# Patient Record
Sex: FEMALE | Race: OTHER | NOT HISPANIC OR LATINO | URBAN - METROPOLITAN AREA
[De-identification: names, ages, dates, MRNs, and addresses within clinical notes are randomized per-mention and may not be internally consistent; named-entity substitution may affect disease eponyms.]

---

## 2019-04-14 ENCOUNTER — EMERGENCY (EMERGENCY)
Facility: HOSPITAL | Age: 19
LOS: 1 days | Discharge: HOME | End: 2019-04-14
Attending: EMERGENCY MEDICINE
Payer: MEDICAID

## 2019-04-14 VITALS
HEART RATE: 105 BPM | SYSTOLIC BLOOD PRESSURE: 131 MMHG | HEIGHT: 66 IN | RESPIRATION RATE: 18 BRPM | WEIGHT: 138.89 LBS | TEMPERATURE: 98 F | OXYGEN SATURATION: 99 % | DIASTOLIC BLOOD PRESSURE: 86 MMHG

## 2019-04-14 PROCEDURE — 93010 ELECTROCARDIOGRAM REPORT: CPT

## 2019-04-14 PROCEDURE — 99285 EMERGENCY DEPT VISIT HI MDM: CPT | Mod: 25

## 2019-04-14 NOTE — ED ADULT TRIAGE NOTE - CHIEF COMPLAINT QUOTE
reports diaphoresis, tachycardia (up to 150) at home for the last 4 days; did not check temp; po intake ok; reports 10 lb wt loss in one month without trying; comes in for no change

## 2019-04-15 VITALS
SYSTOLIC BLOOD PRESSURE: 122 MMHG | OXYGEN SATURATION: 100 % | DIASTOLIC BLOOD PRESSURE: 87 MMHG | RESPIRATION RATE: 18 BRPM | TEMPERATURE: 98 F | HEART RATE: 82 BPM

## 2019-04-15 LAB
ALBUMIN SERPL ELPH-MCNC: 4.5 G/DL — SIGNIFICANT CHANGE UP (ref 3.3–5)
ALP SERPL-CCNC: 89 U/L — SIGNIFICANT CHANGE UP (ref 40–120)
ALT FLD-CCNC: 25 U/L — SIGNIFICANT CHANGE UP (ref 10–45)
ANION GAP SERPL CALC-SCNC: 16 MMOL/L — SIGNIFICANT CHANGE UP (ref 5–17)
APPEARANCE UR: ABNORMAL
AST SERPL-CCNC: 24 U/L — SIGNIFICANT CHANGE UP (ref 10–40)
BACTERIA # UR AUTO: ABNORMAL
BASOPHILS # BLD AUTO: 0 K/UL — SIGNIFICANT CHANGE UP (ref 0–0.2)
BASOPHILS NFR BLD AUTO: 0.3 % — SIGNIFICANT CHANGE UP (ref 0–2)
BILIRUB SERPL-MCNC: 0.2 MG/DL — SIGNIFICANT CHANGE UP (ref 0.2–1.2)
BILIRUB UR-MCNC: NEGATIVE — SIGNIFICANT CHANGE UP
BUN SERPL-MCNC: 16 MG/DL — SIGNIFICANT CHANGE UP (ref 7–23)
CALCIUM SERPL-MCNC: 9.7 MG/DL — SIGNIFICANT CHANGE UP (ref 8.4–10.5)
CHLORIDE SERPL-SCNC: 101 MMOL/L — SIGNIFICANT CHANGE UP (ref 96–108)
CO2 SERPL-SCNC: 23 MMOL/L — SIGNIFICANT CHANGE UP (ref 22–31)
COLOR SPEC: SIGNIFICANT CHANGE UP
CREAT SERPL-MCNC: 0.63 MG/DL — SIGNIFICANT CHANGE UP (ref 0.5–1.3)
D DIMER BLD IA.RAPID-MCNC: 277 NG/ML DDU — HIGH
DIFF PNL FLD: NEGATIVE — SIGNIFICANT CHANGE UP
EOSINOPHIL # BLD AUTO: 0.1 K/UL — SIGNIFICANT CHANGE UP (ref 0–0.5)
EOSINOPHIL NFR BLD AUTO: 1.4 % — SIGNIFICANT CHANGE UP (ref 0–6)
EPI CELLS # UR: 14 /HPF — HIGH
GLUCOSE SERPL-MCNC: 89 MG/DL — SIGNIFICANT CHANGE UP (ref 70–99)
GLUCOSE UR QL: NEGATIVE — SIGNIFICANT CHANGE UP
HCG SERPL-ACNC: <2 MIU/ML — SIGNIFICANT CHANGE UP
HCT VFR BLD CALC: 38.9 % — SIGNIFICANT CHANGE UP (ref 34.5–45)
HGB BLD-MCNC: 13 G/DL — SIGNIFICANT CHANGE UP (ref 11.5–15.5)
HIV 1 & 2 AB SERPL IA.RAPID: SIGNIFICANT CHANGE UP
HYALINE CASTS # UR AUTO: 1 /LPF — SIGNIFICANT CHANGE UP (ref 0–2)
KETONES UR-MCNC: NEGATIVE — SIGNIFICANT CHANGE UP
LEUKOCYTE ESTERASE UR-ACNC: ABNORMAL
LYMPHOCYTES # BLD AUTO: 2.4 K/UL — SIGNIFICANT CHANGE UP (ref 1–3.3)
LYMPHOCYTES # BLD AUTO: 42.8 % — SIGNIFICANT CHANGE UP (ref 13–44)
MAGNESIUM SERPL-MCNC: 1.8 MG/DL — SIGNIFICANT CHANGE UP (ref 1.6–2.6)
MCHC RBC-ENTMCNC: 27.7 PG — SIGNIFICANT CHANGE UP (ref 27–34)
MCHC RBC-ENTMCNC: 33.4 GM/DL — SIGNIFICANT CHANGE UP (ref 32–36)
MCV RBC AUTO: 83 FL — SIGNIFICANT CHANGE UP (ref 80–100)
MONOCYTES # BLD AUTO: 0.6 K/UL — SIGNIFICANT CHANGE UP (ref 0–0.9)
MONOCYTES NFR BLD AUTO: 9.9 % — SIGNIFICANT CHANGE UP (ref 2–14)
NEUTROPHILS # BLD AUTO: 2.6 K/UL — SIGNIFICANT CHANGE UP (ref 1.8–7.4)
NEUTROPHILS NFR BLD AUTO: 45.7 % — SIGNIFICANT CHANGE UP (ref 43–77)
NITRITE UR-MCNC: NEGATIVE — SIGNIFICANT CHANGE UP
PH UR: 6.5 — SIGNIFICANT CHANGE UP (ref 5–8)
PHOSPHATE SERPL-MCNC: 3.9 MG/DL — SIGNIFICANT CHANGE UP (ref 2.5–4.5)
PLATELET # BLD AUTO: 299 K/UL — SIGNIFICANT CHANGE UP (ref 150–400)
POTASSIUM SERPL-MCNC: 3.9 MMOL/L — SIGNIFICANT CHANGE UP (ref 3.5–5.3)
POTASSIUM SERPL-SCNC: 3.9 MMOL/L — SIGNIFICANT CHANGE UP (ref 3.5–5.3)
PROT SERPL-MCNC: 8.3 G/DL — SIGNIFICANT CHANGE UP (ref 6–8.3)
PROT UR-MCNC: NEGATIVE — SIGNIFICANT CHANGE UP
RBC # BLD: 4.69 M/UL — SIGNIFICANT CHANGE UP (ref 3.8–5.2)
RBC # FLD: 11.8 % — SIGNIFICANT CHANGE UP (ref 10.3–14.5)
RBC CASTS # UR COMP ASSIST: 1 /HPF — SIGNIFICANT CHANGE UP (ref 0–4)
SODIUM SERPL-SCNC: 140 MMOL/L — SIGNIFICANT CHANGE UP (ref 135–145)
SP GR SPEC: 1.01 — SIGNIFICANT CHANGE UP (ref 1.01–1.02)
TSH SERPL-MCNC: 0.01 UIU/ML — LOW (ref 0.27–4.2)
UROBILINOGEN FLD QL: NEGATIVE — SIGNIFICANT CHANGE UP
WBC # BLD: 5.7 K/UL — SIGNIFICANT CHANGE UP (ref 3.8–10.5)
WBC # FLD AUTO: 5.7 K/UL — SIGNIFICANT CHANGE UP (ref 3.8–10.5)
WBC UR QL: 10 /HPF — HIGH (ref 0–5)

## 2019-04-15 PROCEDURE — 84100 ASSAY OF PHOSPHORUS: CPT

## 2019-04-15 PROCEDURE — 86703 HIV-1/HIV-2 1 RESULT ANTBDY: CPT

## 2019-04-15 PROCEDURE — 71275 CT ANGIOGRAPHY CHEST: CPT

## 2019-04-15 PROCEDURE — 96360 HYDRATION IV INFUSION INIT: CPT | Mod: XU

## 2019-04-15 PROCEDURE — 93005 ELECTROCARDIOGRAM TRACING: CPT

## 2019-04-15 PROCEDURE — 84443 ASSAY THYROID STIM HORMONE: CPT

## 2019-04-15 PROCEDURE — 81001 URINALYSIS AUTO W/SCOPE: CPT

## 2019-04-15 PROCEDURE — 80053 COMPREHEN METABOLIC PANEL: CPT

## 2019-04-15 PROCEDURE — 71275 CT ANGIOGRAPHY CHEST: CPT | Mod: 26

## 2019-04-15 PROCEDURE — 96361 HYDRATE IV INFUSION ADD-ON: CPT

## 2019-04-15 PROCEDURE — 84702 CHORIONIC GONADOTROPIN TEST: CPT

## 2019-04-15 PROCEDURE — 99284 EMERGENCY DEPT VISIT MOD MDM: CPT | Mod: 25

## 2019-04-15 PROCEDURE — 85027 COMPLETE CBC AUTOMATED: CPT

## 2019-04-15 PROCEDURE — 83735 ASSAY OF MAGNESIUM: CPT

## 2019-04-15 PROCEDURE — 71046 X-RAY EXAM CHEST 2 VIEWS: CPT

## 2019-04-15 PROCEDURE — 71046 X-RAY EXAM CHEST 2 VIEWS: CPT | Mod: 26

## 2019-04-15 PROCEDURE — 85379 FIBRIN DEGRADATION QUANT: CPT

## 2019-04-15 RX ORDER — SODIUM CHLORIDE 9 MG/ML
1000 INJECTION, SOLUTION INTRAVENOUS ONCE
Qty: 0 | Refills: 0 | Status: COMPLETED | OUTPATIENT
Start: 2019-04-15 | End: 2019-04-15

## 2019-04-15 RX ADMIN — SODIUM CHLORIDE 1000 MILLILITER(S): 9 INJECTION, SOLUTION INTRAVENOUS at 00:52

## 2019-04-15 RX ADMIN — SODIUM CHLORIDE 1000 MILLILITER(S): 9 INJECTION, SOLUTION INTRAVENOUS at 03:29

## 2019-04-15 NOTE — ED PROVIDER NOTE - NSFOLLOWUPINSTRUCTIONS_ED_ALL_ED_FT
1. palpitations  2. stay well-hydrated  3. Follow-up with your primary care provider in 24-48 hours  4. Return immediately to the emergency department if any worsening or concerning symptoms.

## 2019-04-15 NOTE — ED PROVIDER NOTE - ATTENDING CONTRIBUTION TO CARE
Attending MD Pat:  I personally have seen and examined this patient.  Resident note reviewed and agree on plan of care and except where noted.  See HPI, PE, and MDM for details.

## 2019-04-15 NOTE — ED PROVIDER NOTE - NEURO NEGATIVE STATEMENT, MLM
no weakness in extremities. no loss of consciousness, no gait abnormality, no headache, no sensory deficits, and no weakness.

## 2019-04-15 NOTE — ED PROVIDER NOTE - CLINICAL SUMMARY MEDICAL DECISION MAKING FREE TEXT BOX
Attending MD Pat: 18F with palpitations and dyspnea x several days, also with 10lb weight loss. Exam is nonlocalizing currently other than occasional low grade tachycardia, EKG wnl, no e/o ACS, myocarditis or pericarditis. Plan for labs, dimer to ro PE, CXR, TSH and re-eval

## 2019-04-15 NOTE — ED PROVIDER NOTE - GASTROINTESTINAL NEGATIVE STATEMENT, MLM
no abdominal pain, no bloating, no constipation, no diarrhea, no nausea and no vomiting. No blood in stool.

## 2019-04-15 NOTE — ED PROVIDER NOTE - CHPI ED SYMPTOMS NEG
no cough/no chills/no runny nose, no sore throat, no difficulty breathing, no abd pain , no N/V/D, no burning urination, no hematuria, no blood in stool, no weakness in extremities./no fever

## 2019-04-15 NOTE — ED PROVIDER NOTE - PROGRESS NOTE DETAILS
ED Sign Out, eval for palpitations/chest pain, pending CTA, nml VS/HD stable, likely d/c w/ close outpt fu if all wnl -- Fernandez Goldstein MD Resident: CTA negative for PE. Current vitals patient is normocardic, normotensive, not concerned for thyrotoxicosis, TSH still pending. Explained that this result will not come back immedaitely and that they can call for results tomorrow. Provided copies of labwork to patient. Will dc home with primary care doctor follow-up.

## 2019-04-15 NOTE — ED PROVIDER NOTE - OBJECTIVE STATEMENT
17 y/o female with no significant pmhx c/o sternal CP and tachycardia x4 days. +diaphoresis and weight loss (10 lbs x1 month). Pt states she has persistent sweating and hot flashes at night since sxs onset. Notes one episode of sudden sternal CP described as heaviness and shaking in chest lasting approximately 1 minute. Denies any difficulty breathing during episode. Reports HA > 100 bpm since sxs onset. Pt also presents to ED c/o ear pain and 2 episodes of bleeding in ears. Notes she was seen by ENT with no dx given. Denies fever, chills, cough, runny nose, sore throat, difficulty breathing, abd pain, N/V/D, burning urination, hematuria, blood in stool, or weakness in arms or legs. Currently sexually active with 1 male partner, sometimes uses protection. NKDA.

## 2019-04-16 NOTE — ED POST DISCHARGE NOTE - DETAILS
spoke with patients mother who is listed as emergency contact who states patient has left for vacation in [place] for 1 week and ther is no better number to reach her. discussed patient needs follow up as soon as possible, recommended she make appt for first thing when she returns and that if her symptoms persist or get worse she should return to an ER in [place]. patients mother demonstrates understanding and will contact her daughter. she also took down admin line number for further questions patient called back, discussed results, she reports HR on her monitor is still > 115 and she is still symptomatic. advised she should go to an ER if her symptoms continue and she was given admin number to call if she is able to see a provider and they require results - Catie Dong PA-C

## 2019-09-17 PROBLEM — Z78.9 OTHER SPECIFIED HEALTH STATUS: Chronic | Status: ACTIVE | Noted: 2019-04-15

## 2019-10-11 PROBLEM — Z00.00 ENCOUNTER FOR PREVENTIVE HEALTH EXAMINATION: Status: ACTIVE | Noted: 2019-10-11

## 2019-10-21 ENCOUNTER — APPOINTMENT (OUTPATIENT)
Dept: CARDIOLOGY | Facility: CLINIC | Age: 19
End: 2019-10-21
Payer: MEDICAID

## 2019-10-21 ENCOUNTER — NON-APPOINTMENT (OUTPATIENT)
Age: 19
End: 2019-10-21

## 2019-10-21 VITALS
DIASTOLIC BLOOD PRESSURE: 76 MMHG | OXYGEN SATURATION: 99 % | HEART RATE: 109 BPM | WEIGHT: 141 LBS | BODY MASS INDEX: 23.49 KG/M2 | HEIGHT: 65 IN | SYSTOLIC BLOOD PRESSURE: 100 MMHG

## 2019-10-21 DIAGNOSIS — Z86.39 PERSONAL HISTORY OF OTHER ENDOCRINE, NUTRITIONAL AND METABOLIC DISEASE: ICD-10-CM

## 2019-10-21 DIAGNOSIS — R00.2 PALPITATIONS: ICD-10-CM

## 2019-10-21 DIAGNOSIS — Z83.438 FAMILY HISTORY OF OTHER DISORDER OF LIPOPROTEIN METABOLISM AND OTHER LIPIDEMIA: ICD-10-CM

## 2019-10-21 DIAGNOSIS — R07.89 OTHER CHEST PAIN: ICD-10-CM

## 2019-10-21 PROCEDURE — 93225 XTRNL ECG REC<48 HRS REC: CPT

## 2019-10-21 PROCEDURE — 93000 ELECTROCARDIOGRAM COMPLETE: CPT | Mod: 59

## 2019-10-21 PROCEDURE — 99204 OFFICE O/P NEW MOD 45 MIN: CPT

## 2019-10-21 RX ORDER — PROPRANOLOL HYDROCHLORIDE 10 MG/1
10 TABLET ORAL
Refills: 0 | Status: ACTIVE | COMMUNITY
Start: 2019-10-21

## 2019-10-21 RX ORDER — METHIMAZOLE 10 MG/1
10 TABLET ORAL
Refills: 0 | Status: ACTIVE | COMMUNITY
Start: 2019-10-21

## 2019-10-21 NOTE — PHYSICAL EXAM
[General Appearance - Well Developed] : well developed [Normal Appearance] : normal appearance [Well Groomed] : well groomed [General Appearance - In No Acute Distress] : no acute distress [No Deformities] : no deformities [General Appearance - Well Nourished] : well nourished [Normal Conjunctiva] : the conjunctiva exhibited no abnormalities [Eyelids - No Xanthelasma] : the eyelids demonstrated no xanthelasmas [No Oral Pallor] : no oral pallor [Normal Oral Mucosa] : normal oral mucosa [No Oral Cyanosis] : no oral cyanosis [Normal Jugular Venous V Waves Present] : normal jugular venous V waves present [Normal Jugular Venous A Waves Present] : normal jugular venous A waves present [No Jugular Venous Bennett A Waves] : no jugular venous bennett A waves [Heart Rate And Rhythm] : heart rate and rhythm were normal [Heart Sounds] : normal S1 and S2 [Murmurs] : no murmurs present [Edema] : no peripheral edema present [Left Carotid Bruit] : no bruit heard over the left carotid [Right Carotid Bruit] : no bruit heard over the right carotid [1+] : left 1+ [Bruit] : no bruit heard [Exaggerated Use Of Accessory Muscles For Inspiration] : no accessory muscle use [Respiration, Rhythm And Depth] : normal respiratory rhythm and effort [Abdomen Soft] : soft [Auscultation Breath Sounds / Voice Sounds] : lungs were clear to auscultation bilaterally [Bowel Sounds] : normal bowel sounds [Abdomen Tenderness] : non-tender [Abnormal Walk] : normal gait [Gait - Sufficient For Exercise Testing] : the gait was sufficient for exercise testing [Nail Clubbing] : no clubbing of the fingernails [Petechial Hemorrhages (___cm)] : no petechial hemorrhages [Cyanosis, Localized] : no localized cyanosis [Skin Color & Pigmentation] : normal skin color and pigmentation [] : no rash [No Venous Stasis] : no venous stasis [Skin Lesions] : no skin lesions [No Skin Ulcers] : no skin ulcer [No Xanthoma] : no  xanthoma was observed [Oriented To Time, Place, And Person] : oriented to person, place, and time [No Anxiety] : not feeling anxious [Mood] : the mood was normal [Affect] : the affect was normal

## 2019-10-21 NOTE — REASON FOR VISIT
[Initial Evaluation] : an initial evaluation of [Chest Pain] : chest pain [Palpitations] : palpitations

## 2019-10-21 NOTE — HISTORY OF PRESENT ILLNESS
[FreeTextEntry1] : She is 19 years of age with recent finding of hyperthyroidism placed on methimazole and propranolol for sinus tachycardia in the 150 beats per minute range it sounds. Recent thyroid T3 values have improved on therapy and her sinus tachycardia has gotten better but she still experiences intermittent palpitations associated with chest discomfort. She's had to limit her exercise ability as a result. Here for cardiac assessment today. Has had problems with weight changes as a result as well.

## 2019-10-21 NOTE — DISCUSSION/SUMMARY
[___ Week(s)] : [unfilled] week(s) [FreeTextEntry1] : Ordered an echocardiogram to assess LV function and valvular status along with a 24-hour Holter monitor to document dysrhythmia. Regular stress testing was also ordered to check for cardiac reserve to fitness and assess vital response to activity. Following home heart rates and blood pressure is was also encouraged and she'll follow up with you for care. She is advised to see me in about one months time or sooner if needed to review these findings. Propranolol can be used as she's doing now once daily p.r.n. and if her blood pressures have been marginal she could take half a tablet at night if needed. Liberalization of fluid intake was also encouraged given marginal blood pressure. Heart healthy diet with low fat, low cholesterol foods. Aerobic fitness in general recommended that she is advised to avoid activities now until further workup and reviewed. She verbalized understanding and agreed to the plan. [FreeTextEntry3] : echo, holter, stress test

## 2019-11-11 ENCOUNTER — APPOINTMENT (OUTPATIENT)
Dept: CARDIOLOGY | Facility: CLINIC | Age: 19
End: 2019-11-11
Payer: MEDICAID

## 2019-11-11 PROCEDURE — 93306 TTE W/DOPPLER COMPLETE: CPT

## 2019-11-19 ENCOUNTER — APPOINTMENT (OUTPATIENT)
Dept: CARDIOLOGY | Facility: CLINIC | Age: 19
End: 2019-11-19

## 2019-11-26 ENCOUNTER — APPOINTMENT (OUTPATIENT)
Dept: CARDIOLOGY | Facility: CLINIC | Age: 19
End: 2019-11-26

## 2019-12-23 ENCOUNTER — EMERGENCY (EMERGENCY)
Facility: HOSPITAL | Age: 19
LOS: 1 days | Discharge: HOME | End: 2019-12-23
Attending: EMERGENCY MEDICINE
Payer: MEDICAID

## 2019-12-23 VITALS
DIASTOLIC BLOOD PRESSURE: 83 MMHG | OXYGEN SATURATION: 98 % | HEART RATE: 81 BPM | RESPIRATION RATE: 18 BRPM | SYSTOLIC BLOOD PRESSURE: 118 MMHG | TEMPERATURE: 98 F

## 2019-12-23 VITALS
HEIGHT: 65 IN | OXYGEN SATURATION: 98 % | DIASTOLIC BLOOD PRESSURE: 78 MMHG | SYSTOLIC BLOOD PRESSURE: 119 MMHG | RESPIRATION RATE: 18 BRPM | TEMPERATURE: 99 F | HEART RATE: 119 BPM | WEIGHT: 145.06 LBS

## 2019-12-23 LAB
APPEARANCE UR: ABNORMAL
BACTERIA # UR AUTO: ABNORMAL
BILIRUB UR-MCNC: NEGATIVE — SIGNIFICANT CHANGE UP
COLOR SPEC: ABNORMAL
DIFF PNL FLD: NEGATIVE — SIGNIFICANT CHANGE UP
EPI CELLS # UR: 69 /HPF — HIGH
GLUCOSE UR QL: NEGATIVE — SIGNIFICANT CHANGE UP
HYALINE CASTS # UR AUTO: 6 /LPF — HIGH (ref 0–2)
KETONES UR-MCNC: NEGATIVE — SIGNIFICANT CHANGE UP
LEUKOCYTE ESTERASE UR-ACNC: ABNORMAL
NITRITE UR-MCNC: NEGATIVE — SIGNIFICANT CHANGE UP
PH UR: 6 — SIGNIFICANT CHANGE UP (ref 5–8)
PROT UR-MCNC: ABNORMAL
RBC CASTS # UR COMP ASSIST: 0 /HPF — SIGNIFICANT CHANGE UP (ref 0–4)
SP GR SPEC: 1.02 — SIGNIFICANT CHANGE UP (ref 1.01–1.02)
UROBILINOGEN FLD QL: NEGATIVE — SIGNIFICANT CHANGE UP
WBC UR QL: 5 /HPF — SIGNIFICANT CHANGE UP (ref 0–5)

## 2019-12-23 PROCEDURE — 99283 EMERGENCY DEPT VISIT LOW MDM: CPT

## 2019-12-23 PROCEDURE — 81001 URINALYSIS AUTO W/SCOPE: CPT

## 2019-12-23 RX ORDER — ONDANSETRON 8 MG/1
4 TABLET, FILM COATED ORAL ONCE
Refills: 0 | Status: COMPLETED | OUTPATIENT
Start: 2019-12-23 | End: 2019-12-23

## 2019-12-23 RX ORDER — IBUPROFEN 200 MG
600 TABLET ORAL ONCE
Refills: 0 | Status: COMPLETED | OUTPATIENT
Start: 2019-12-23 | End: 2019-12-23

## 2019-12-23 RX ADMIN — ONDANSETRON 4 MILLIGRAM(S): 8 TABLET, FILM COATED ORAL at 17:52

## 2019-12-23 RX ADMIN — Medication 600 MILLIGRAM(S): at 17:52

## 2019-12-23 NOTE — ED ADULT NURSE NOTE - NSIMPLEMENTINTERV_GEN_ALL_ED
Implemented All Universal Safety Interventions:  [place] to call system. Call bell, personal items and telephone within reach. Instruct patient to call for assistance. Room bathroom lighting operational. Non-slip footwear when patient is off stretcher. Physically safe environment: no spills, clutter or unnecessary equipment. Stretcher in lowest position, wheels locked, appropriate side rails in place.

## 2019-12-23 NOTE — ED PROVIDER NOTE - CLINICAL SUMMARY MEDICAL DECISION MAKING FREE TEXT BOX
18 yo female with upper respiratory infectious symptoms, post tussive emesis, and inability to tolerate PO. Will give patient Zofran ODT 4 mg and PO challenge. There is a minimal concern for PNA and meningitis. 20 yo female with upper respiratory infectious symptoms, post tussive emesis, and inability to tolerate PO. Will give patient Zofran ODT 4 mg and PO challenge. There is a minimal concern for PNA and meningitis.    Farhana Bryant MD - Attending Physician: Pt here with URI symptoms. Notes posttussive emesis and not taking much PO. Well hydrated on exam, abd nontender, nonfocal exam. Meds, po chall for likely dc

## 2019-12-23 NOTE — ED PROVIDER NOTE - PROGRESS NOTE DETAILS
PGY1 Lucila: pt with significant improvement of clinical symptoms. Able to tolerate PO intake without any vomiting or abdominal pain. Will discharge home. VSS. PGY1 Lucila: pt with significant improvement of clinical symptoms. Able to tolerate PO intake without any vomiting or abdominal pain. No clinical signs of dehydration. Will discharge home. VSS.

## 2019-12-23 NOTE — ED PROVIDER NOTE - ATTENDING CONTRIBUTION TO CARE
Farhana Bryant MD - Attending Physician: I have personally seen and examined this patient with the resident/fellow.  I have fully participated in the care of this patient. I have reviewed all pertinent clinical information, including history, physical exam, plan and the Resident/Fellow’s note and agree except as noted. See MDM

## 2019-12-23 NOTE — ED PROVIDER NOTE - CARE PLAN
Principal Discharge DX:	Flu Principal Discharge DX:	Upper respiratory tract infection, unspecified type

## 2019-12-23 NOTE — ED PROVIDER NOTE - ADDITIONAL NOTES AND INSTRUCTIONS:
Patient: was seen and evaluated at [facility] Emergency Department for an urgent medical condition. Please excuse said patient from all work/school related activities.  Thank You,  Sergo Gaytan DO  Dept. of Emergency Medicine  [facility] | [place]

## 2019-12-23 NOTE — ED ADULT NURSE NOTE - CHPI ED NUR SYMPTOMS NEG
no dizziness/no tingling/no weakness/no decreased eating/drinking/no chills/no fever/no vomiting/no pain

## 2019-12-23 NOTE — ED PROVIDER NOTE - ENMT, MLM
Airway patent, Nasal mucosa clear. Mouth with normal mucosa. Throat has no vesicles, no posterior oropharyngeal exudates and uvula is midline.

## 2019-12-23 NOTE — ED ADULT NURSE NOTE - OBJECTIVE STATEMENT
19 year old female presented to ED with c/o of nausea x2 days, decreased PO intake. hx hypothyroidism. pt denies CP, SOB, nausea/vomiting, numbness/tingling, fever, cough, chills, dizziness, headache, blurred vision, neuro intact. pt a&ox3, lung sounds clear, heart rate regular, abdomen soft nontender nondistended to palp. skin intact. pt currently resting in bed comfortably, administered Zofran w/ relief. Will continue to monitor and assess while offering support and reassurance.

## 2019-12-23 NOTE — ED PROVIDER NOTE - PATIENT PORTAL LINK FT
You can access the FollowMyHealth Patient Portal offered by [facility] by registering at the following website: [email or web address]. By joining [employer]’s FollowMyHealth portal, you will also be able to view your health information using other applications (apps) compatible with our system.

## 2019-12-23 NOTE — ED PROVIDER NOTE - OBJECTIVE STATEMENT
18 yo female with PMHx of Hypothyroidism, presents with 2 days of tactile fever, post tussive emesis, nausea, and upper respiratory symptoms, nasal congestion, and rhinorrhea. Patient also states that she has been unable to tolerate PO intake secondary to vomiting s/p coughing. Patient denies any dysuric symptoms, diarrhea, constipation, and any recent travel.

## 2019-12-23 NOTE — ED PROVIDER NOTE - NSFOLLOWUPINSTRUCTIONS_ED_ALL_ED_FT
Viral Illness, Adult  Viruses are tiny germs that can get into a person's body and cause illness. There are many different types of viruses, and they cause many types of illness. Viral illnesses can range from mild to severe. They can affect various parts of the body.    Common illnesses that are caused by a virus include colds and the flu. Viral illnesses also include serious conditions such as HIV/AIDS (human immunodeficiency virus/acquired immunodeficiency syndrome). A few viruses have been linked to certain cancers.    What are the causes?  Many types of viruses can cause illness. Viruses invade cells in your body, multiply, and cause the infected cells to malfunction or die. When the cell dies, it releases more of the virus. When this happens, you develop symptoms of the illness, and the virus continues to spread to other cells. If the virus takes over the function of the cell, it can cause the cell to divide and grow out of control, as is the case when a virus causes cancer.  Different viruses get into the body in different ways.   You can get a virus by:  Swallowing food or water that is contaminated with the virus. Breathing in droplets that have been coughed or sneezed into the air by an infected person. Touching a surface that has been contaminated with the virus and then touching your eyes, nose, or mouth. Being bitten by an insect or animal that carries the virus. Having sexual contact with a person who is infected with the virus. Being exposed to blood or fluids that contain the virus, either through an open cut or during a transfusion. If a virus enters your body, your body's defense system (immune system) will try to fight the virus. You may be at higher risk for a viral illness if your immune system is weak.    What are the signs or symptoms?  Symptoms vary depending on the type of virus and the location of the cells that it invades. Common symptoms of the main types of viral illnesses include:  Cold and flu viruses   Fever. Headache. Sore throat. Muscle aches. Nasal congestion. Cough.    Digestive system (gastrointestinal) viruses  Fever. Abdominal pain. Nausea. Diarrhea.    Liver viruses (hepatitis)   Loss of appetite.Tiredness.Yellowing of the skin (jaundice).    Brain and spinal cord viruses   Fever.Headache.Stiff neck.Nausea and vomiting.Confusion or sleepiness.    Skin viruses   Warts.Itching.Rash.    Sexually transmitted viruses   Discharge.Swelling.Redness.Rash.    How is this treated?  Viruses can be difficult to treat because they live within cells. Antibiotic medicines do not treat viruses because these drugs do not get inside cells. Treatment for a viral illness may include:  Resting and drinking plenty of fluids.Medicines to relieve symptoms. These can include over-the-counter medicine for pain and fever, medicines for cough or congestion, and medicines to relieve diarrhea.Antiviral medicines. These drugs are available only for certain types of viruses. They may help reduce flu symptoms if taken early. There are also many antiviral medicines for hepatitis and HIV/AIDS.Some viral illnesses can be prevented with vaccinations. A common example is the flu shot.    Follow these instructions at home:  Medicines   Take over-the-counter and prescription medicines only as told by your health care provider. If you were prescribed an antiviral medicine, take it as told by your health care provider. Do not stop taking the medicine even if you start to feel better. Be aware of when antibiotics are needed and when they are not needed. Antibiotics DO NOT treat viruses. If your health care provider thinks that you may have a bacterial infection as well as a viral infection, you may get an antibiotic. Do not ask for an antibiotic prescription if you have been diagnosed with a viral illness. That will not make your illness go away faster. Frequently taking antibiotics when they are not needed can lead to antibiotic resistance. When this develops, the medicine no longer works against the bacteria that it normally fights.    General instructions   Drink enough fluids to keep your urine clear or pale yellow. Rest as much as possible. Return to your normal activities as told by your health care provider. Ask your health care provider what activities are safe for you. Keep all follow-up visits as told by your health care provider. This is important.    How is this prevented?  Take these actions to reduce your risk of viral infection:  Eat a healthy diet and get enough rest. Wash your hands often with soap and water. This is especially important when you are in public places. If soap and water are not available, use hand . Avoid close contact with friends and family who have a viral illness. If you travel to areas where viral gastrointestinal infection is common, avoid drinking water or eating raw food. Keep your immunizations up to date. Get a flu shot every year as told by your health care provider. Do not share toothbrushes, nail clippers, razors, or needles with other people. Always practice safe sex.    Contact a health care provider if:  You have symptoms of a viral illness that do not go away. Your symptoms come back after going away. Your symptoms get worse.  Get help right away if:  You have trouble breathing. You have a severe headache or a stiff neck. You have severe vomiting or abdominal pain.   This information is not intended to replace advice given to you by your health care provider. Make sure you discuss any questions you have with your health care provider.

## 2020-07-29 ENCOUNTER — RESULT REVIEW (OUTPATIENT)
Age: 20
End: 2020-07-29

## 2024-04-17 ENCOUNTER — NON-APPOINTMENT (OUTPATIENT)
Age: 24
End: 2024-04-17